# Patient Record
Sex: FEMALE | Race: WHITE | URBAN - METROPOLITAN AREA
[De-identification: names, ages, dates, MRNs, and addresses within clinical notes are randomized per-mention and may not be internally consistent; named-entity substitution may affect disease eponyms.]

---

## 2022-02-11 ENCOUNTER — APPOINTMENT (OUTPATIENT)
Dept: URBAN - METROPOLITAN AREA CLINIC 232 | Age: 27
Setting detail: DERMATOLOGY
End: 2022-02-11

## 2022-02-11 DIAGNOSIS — L30.0 NUMMULAR DERMATITIS: ICD-10-CM

## 2022-02-11 PROBLEM — D23.62 OTHER BENIGN NEOPLASM OF SKIN OF LEFT UPPER LIMB, INCLUDING SHOULDER: Status: ACTIVE | Noted: 2022-02-11

## 2022-02-11 PROCEDURE — 99203 OFFICE O/P NEW LOW 30 MIN: CPT

## 2022-02-11 PROCEDURE — OTHER PRESCRIPTION: OTHER

## 2022-02-11 PROCEDURE — OTHER COUNSELING: OTHER

## 2022-02-11 PROCEDURE — OTHER MIPS QUALITY: OTHER

## 2022-02-11 RX ORDER — BETAMETHASONE DIPROPIONATE 0.5 MG/G
CREAM, AUGMENTED TOPICAL
Qty: 50 | Refills: 0 | Status: ERX | COMMUNITY
Start: 2022-02-11

## 2022-02-11 ASSESSMENT — SEVERITY ASSESSMENT: SEVERITY: MILD TO MODERATE

## 2022-02-24 ENCOUNTER — APPOINTMENT (OUTPATIENT)
Dept: URBAN - METROPOLITAN AREA CLINIC 232 | Age: 27
Setting detail: DERMATOLOGY
End: 2022-02-24

## 2022-02-24 DIAGNOSIS — L30.0 NUMMULAR DERMATITIS: ICD-10-CM

## 2022-02-24 PROCEDURE — OTHER COUNSELING: OTHER

## 2022-02-24 PROCEDURE — OTHER TREATMENT REGIMEN: OTHER

## 2022-02-24 PROCEDURE — OTHER MIPS QUALITY: OTHER

## 2022-02-24 PROCEDURE — 99213 OFFICE O/P EST LOW 20 MIN: CPT

## 2022-02-24 ASSESSMENT — SEVERITY ASSESSMENT: SEVERITY: ALMOST CLEAR

## 2022-02-24 NOTE — PROCEDURE: TREATMENT REGIMEN
Continue Regimen: Betamethasone augmented to once a week take a break then start again for once a week
Detail Level: Zone

## 2024-07-29 NOTE — PROCEDURE: MIPS QUALITY
Subjective:       Patient ID: Juanita Underwood is a 31 y.o. female.    Chief Complaint: Sinus Problem (Consistent chronic sinus )    Juanita is here for follow-up.   Here today for rhinitis, nasal obstruction.   She has been using flonase and saline regularly.   She has continued to have nasal and throat concerns:  She has bothersome nasal congestion, sore throat, sensation of fullness in both ears.  Her sore throat has been more bothersome, describes the pain is 7/10 today, began worse 3 days ago. Sick for 2 weeks prior.  She denies reflux / heartburn  Last OV 4/2023  She reported rash to Amox while she has Mono - which is not an allergy    Had skin allergy testing 5 years ago which was negative.    Patient validated questionnaires (if applicable):      %            No data to display                   No data to display                   No data to display                     Review of Systems   Constitutional: Negative for activity change and appetite change.   Respiratory: Negative for difficulty breathing and wheezing   Cardiovascular: Negative for chest pain.      Objective:        Constitutional:   Vital signs are normal. She appears well-developed and well-nourished.     Head:  Normocephalic and atraumatic.     Ears:  Hearing normal to normal and whispered voice; external ear normal without scars, lesions, or masses; ear canal, tympanic membrane, and middle ear normal..     Nose:  Nose normal including turbinates, nasal mucosa, sinuses and nasal septum. Septal deviation (left moderate spur) present. Turbinate hypertrophy (boggy, moderate edema).      Mouth/Throat  Oropharynx clear and moist without lesions or asymmetry. Tonsils present, +1, tonsillar erythema, tonsillar exudate.      Neck:  Neck normal without thyromegaly masses, asymmetry, normal tracheal structure, crepitus, and tenderness.         Tests / Results:  I personally reviewed the CT Sinus and my findings reveal:   Right edita, Clear 
paranasal sinuses. Small nonobstructive Victor Manuel cells. Patent drainage pathways.  NSD  ITH    Assessment:       1. Tonsillitis    2. Non-allergic rhinitis    3. Nasal septal deviation    4. Hypertrophy of both inferior nasal turbinates    5. Nasal obstruction          Plan:       Abx for Pharyngitis, supportive care  She is agreeable to Amoxicillin as it does not sound like a rash    We discussed adding Astelin to INS  We also discussed septoplasty, right edita, and turbinate reduction as an option for nasal obstruction. Would likely need to continue rhinitis management  post-op.     I discussed the risks of septoplasty and turbinate reduction including persistent issues, bleeding, perforation, smell changes, injury to orbit, CSF leak / meningitis, external nasal changes, tearing abnormalities.         
Quality 226: Preventive Care And Screening: Tobacco Use: Screening And Cessation Intervention: Patient screened for tobacco use and is an ex/non-smoker
Quality 130: Documentation Of Current Medications In The Medical Record: Current Medications Documented
Detail Level: Detailed